# Patient Record
Sex: FEMALE | Race: WHITE | Employment: FULL TIME | URBAN - METROPOLITAN AREA
[De-identification: names, ages, dates, MRNs, and addresses within clinical notes are randomized per-mention and may not be internally consistent; named-entity substitution may affect disease eponyms.]

---

## 2019-03-21 PROBLEM — E66.01 OBESITY, MORBID (HCC): Status: ACTIVE | Noted: 2019-03-21

## 2020-01-06 PROBLEM — N84.0 UTERINE POLYP: Status: ACTIVE | Noted: 2020-01-06

## 2020-01-06 PROBLEM — N83.202 CYST OF LEFT OVARY: Status: ACTIVE | Noted: 2020-01-06

## 2020-02-20 PROBLEM — N93.9 ABNORMAL UTERINE BLEEDING (AUB): Status: ACTIVE | Noted: 2020-02-20

## 2020-02-20 PROBLEM — N83.209 CYST OF OVARY: Status: ACTIVE | Noted: 2020-01-06

## 2022-02-10 ENCOUNTER — HOSPITAL ENCOUNTER (OUTPATIENT)
Dept: SURGERY | Age: 37
Discharge: HOME | End: 2022-02-10

## 2022-02-10 VITALS — HEIGHT: 68 IN | BODY MASS INDEX: 44.41 KG/M2 | WEIGHT: 293 LBS

## 2022-02-10 RX ORDER — BUPROPION HYDROCHLORIDE 150 MG/1
1 TABLET ORAL
COMMUNITY
Start: 2021-08-12 | End: 2022-03-02 | Stop reason: SINTOL

## 2022-02-10 NOTE — PERIOP NOTES
Patient verified name and . Order for consent NOT found in EHR; patient verifies procedure. Type 1B surgery, phone assessment complete. Orders not received. Labs per surgeon: no orders received  Labs per anesthesia protocol: Patient's states Hgb 12.5 on 22; lab work was completed at Dr. Anup Yepez' office (hematology). Care everywhere is not updating. Pt instructed to bring results DOS. Patient states she lives over an hour aware and will have a COVID test completed within 4 days of sx. Pt instructed to bring results DOS. Pt verbalized understanding. Patient answered medical/surgical history questions at their best of ability. All prior to admission medications documented in [facility]. Patient instructed to take the following medications the day of surgery according to anesthesia guidelines with a small sip of water: Wellbutrin PRN, Nexium Zoloft. On the day before surgery please take Acetaminophen 1000mg in the morning and then again before bed. You may substitute for Tylenol 650 mg. Hold all vitamins 7 days prior to surgery and NSAIDS 5 days prior to surgery. Patient instructed on the following:    > Arrive at [address], time of arrival to be called the day before by 1700  > NPO after midnight including gum, mints, and ice chips  > Responsible adult must drive patient to the hospital, stay during surgery, and patient will need supervision 24 hours after anesthesia  > Use anti-bacterial soap in shower the night before surgery and on the morning of surgery  > All piercings must be removed prior to arrival.    > Leave all valuables (money and jewelry) at home but bring insurance card and ID on DOS.   > You may be required to pay a deductible or co-pay on the day of your procedure. You can pre-pay by calling [phone] if your surgery is at the [facility] or [phone] if your surgery is at the [facility].   > Do not wear make-up, nail polish, lotions, cologne, perfumes, powders, or oil on skin. Artificial nails are not permitted.

## 2022-02-11 NOTE — H&P
History and Physical      Ana Gray:   Physician:  Leonel Wei. Alt, DO    This 43 y.o. female presents today for pre-operative evaluation. History: This 43 y.o.  patient has history of  heavy bleeding, dermoid cyst, and endometrial polyp. Previous treatment includes: serial us and progesterone therapy. She has not had a cycle since the fall. Discussed that she is at a huge risk of uterine cancer with her weight. She has had multiple us that shows polyp. OB History        2    Para   1    Term   1            AB   1    Living   1       SAB   1    IAB        Ectopic        Molar        Multiple        Live Births   1                Past Medical History:   Diagnosis Date    Anxiety and depression     managed with medication     Essential hypertension     hx during teens    Fracture 2015    Left Foot    GERD (gastroesophageal reflux disease)     managed with medication     Iron deficiency anemia     secondary to menstrul cycle, followed by Dr. Lizz Sahu, Hgb 12.5 22    Irregular menstruation     Miscarriage     SAB    Ovarian cyst 2020    Other    Paratubal cyst     Uterine anomaly 2020    Polyp        has a past surgical history that includes hx ovarian cyst removal; hx wisdom teeth extraction; and hx orthopaedic (Left, ). Current Outpatient Medications   Medication Sig Dispense    Lactobacillus acidophilus (PROBIOTIC PO) Take  by mouth.  buPROPion XL (Wellbutrin XL) 150 mg tablet Take 1 Tablet by mouth daily as needed.  esomeprazole magnesium (NEXIUM PO) Take 20 mg by mouth daily.  sertraline (ZOLOFT) 100 mg tablet Take  by mouth daily. No current facility-administered medications for this visit. No Known Allergies. reports that she has never smoked. She has never used smokeless tobacco. She reports that she does not drink alcohol and does not use drugs.     family history includes Cancer in her paternal aunt; Diabetes in her mother; Heart Disease in her father, maternal grandfather, mother, and paternal grandfather; Hypertension in her father; No Known Problems in her maternal grandmother, paternal grandmother, and sister; Ovarian Cancer in her mother. Physical Exam:    Vitals:    02/11/22 0838   BP: 112/68   Weight: (!) 365 lb 6.4 oz (165.7 kg)   Height: 5' 6\" (1.676 m)       Patient without distress. Heart: Regular rate and rhythm   Lung: clear to auscultation throughout lung fields, no wheezes, no rales, no rhonchi and normal respiratory effort  Abdomen: soft, nontender  Lower Extremities:  - Edema No    Findings/Diagnosis:   Pre-Op Evaluation done today. abnormal uterine bleeding and dermoid cyst, endometrial polyp     Surgery to be Performed:  hysteroscopy with D&C  Surgery Date:    Surgery Place:  [facility]  Surgery Duration:  0.5 hour  Surgery Type:  Assistant Surgeon: The risks of surgery were discussed in detail, including anesthesia, hemorrhage, blood clots, infection, risk of uterine perforation and need for lapaoroscopy. She is very worried and anxious and has postponed this. She is worried about going to sleep. Could have spinal.  She does not want to have it done but also knows that she needs to. All questions answered.

## 2022-02-16 ENCOUNTER — ANESTHESIA EVENT (OUTPATIENT)
Dept: SURGERY | Age: 37
End: 2022-02-16
Payer: COMMERCIAL

## 2022-02-17 ENCOUNTER — ANESTHESIA (OUTPATIENT)
Dept: SURGERY | Age: 37
End: 2022-02-17
Payer: COMMERCIAL

## 2022-02-17 ENCOUNTER — HOSPITAL ENCOUNTER (OUTPATIENT)
Age: 37
Setting detail: OUTPATIENT SURGERY
Discharge: HOME | End: 2022-02-17
Attending: OBSTETRICS & GYNECOLOGY | Admitting: OBSTETRICS & GYNECOLOGY
Payer: COMMERCIAL

## 2022-02-17 VITALS
BODY MASS INDEX: 45.99 KG/M2 | WEIGHT: 293 LBS | DIASTOLIC BLOOD PRESSURE: 60 MMHG | SYSTOLIC BLOOD PRESSURE: 131 MMHG | RESPIRATION RATE: 15 BRPM | HEART RATE: 72 BPM | HEIGHT: 67 IN | TEMPERATURE: 98.1 F | OXYGEN SATURATION: 95 %

## 2022-02-17 DIAGNOSIS — N84.0 UTERINE POLYP: ICD-10-CM

## 2022-02-17 DIAGNOSIS — N93.9 ABNORMAL UTERINE BLEEDING (AUB): ICD-10-CM

## 2022-02-17 LAB — HCG UR QL: NEGATIVE

## 2022-02-17 PROCEDURE — 77030039425 HC BLD LARYNG TRULITE DISP TELE -A: Performed by: ANESTHESIOLOGY

## 2022-02-17 PROCEDURE — 76210000021 HC REC RM PH II 0.5 TO 1 HR: Performed by: OBSTETRICS & GYNECOLOGY

## 2022-02-17 PROCEDURE — 74011250637 HC RX REV CODE- 250/637: Performed by: ANESTHESIOLOGY

## 2022-02-17 PROCEDURE — 77030037088 HC TUBE ENDOTRACH ORAL NSL COVD-A: Performed by: ANESTHESIOLOGY

## 2022-02-17 PROCEDURE — 74011250636 HC RX REV CODE- 250/636: Performed by: ANESTHESIOLOGY

## 2022-02-17 PROCEDURE — 76010000138 HC OR TIME 0.5 TO 1 HR: Performed by: OBSTETRICS & GYNECOLOGY

## 2022-02-17 PROCEDURE — 76060000032 HC ANESTHESIA 0.5 TO 1 HR: Performed by: OBSTETRICS & GYNECOLOGY

## 2022-02-17 PROCEDURE — 81025 URINE PREGNANCY TEST: CPT

## 2022-02-17 PROCEDURE — 76210000006 HC OR PH I REC 0.5 TO 1 HR: Performed by: OBSTETRICS & GYNECOLOGY

## 2022-02-17 PROCEDURE — 74011000250 HC RX REV CODE- 250: Performed by: ANESTHESIOLOGY

## 2022-02-17 PROCEDURE — 77030019927 HC TBNG IRR CYSTO BAXT -A: Performed by: OBSTETRICS & GYNECOLOGY

## 2022-02-17 PROCEDURE — 77030040361 HC SLV COMPR DVT MDII -B: Performed by: OBSTETRICS & GYNECOLOGY

## 2022-02-17 PROCEDURE — 88305 TISSUE EXAM BY PATHOLOGIST: CPT

## 2022-02-17 PROCEDURE — 58558 HYSTEROSCOPY BIOPSY: CPT | Performed by: OBSTETRICS & GYNECOLOGY

## 2022-02-17 PROCEDURE — 2709999900 HC NON-CHARGEABLE SUPPLY: Performed by: OBSTETRICS & GYNECOLOGY

## 2022-02-17 RX ORDER — SODIUM CHLORIDE, SODIUM LACTATE, POTASSIUM CHLORIDE, CALCIUM CHLORIDE 600; 310; 30; 20 MG/100ML; MG/100ML; MG/100ML; MG/100ML
100 INJECTION, SOLUTION INTRAVENOUS CONTINUOUS
Status: DISCONTINUED | OUTPATIENT
Start: 2022-02-17 | End: 2022-02-17 | Stop reason: HOSPADM

## 2022-02-17 RX ORDER — SODIUM CHLORIDE 0.9 % (FLUSH) 0.9 %
5-40 SYRINGE (ML) INJECTION EVERY 8 HOURS
Status: DISCONTINUED | OUTPATIENT
Start: 2022-02-17 | End: 2022-02-17 | Stop reason: HOSPADM

## 2022-02-17 RX ORDER — OXYCODONE HYDROCHLORIDE 5 MG/1
5 TABLET ORAL
Status: COMPLETED | OUTPATIENT
Start: 2022-02-17 | End: 2022-02-17

## 2022-02-17 RX ORDER — OXYCODONE HYDROCHLORIDE 5 MG/1
5 TABLET ORAL
Qty: 5 TABLET | Refills: 0 | Status: SHIPPED | OUTPATIENT
Start: 2022-02-17 | End: 2022-02-20

## 2022-02-17 RX ORDER — ROCURONIUM BROMIDE 10 MG/ML
INJECTION, SOLUTION INTRAVENOUS AS NEEDED
Status: DISCONTINUED | OUTPATIENT
Start: 2022-02-17 | End: 2022-02-17 | Stop reason: HOSPADM

## 2022-02-17 RX ORDER — LIDOCAINE HYDROCHLORIDE 10 MG/ML
0.1 INJECTION INFILTRATION; PERINEURAL AS NEEDED
Status: DISCONTINUED | OUTPATIENT
Start: 2022-02-17 | End: 2022-02-17 | Stop reason: HOSPADM

## 2022-02-17 RX ORDER — ONDANSETRON 2 MG/ML
INJECTION INTRAMUSCULAR; INTRAVENOUS AS NEEDED
Status: DISCONTINUED | OUTPATIENT
Start: 2022-02-17 | End: 2022-02-17 | Stop reason: HOSPADM

## 2022-02-17 RX ORDER — SODIUM CHLORIDE 0.9 % (FLUSH) 0.9 %
5-40 SYRINGE (ML) INJECTION AS NEEDED
Status: DISCONTINUED | OUTPATIENT
Start: 2022-02-17 | End: 2022-02-17 | Stop reason: HOSPADM

## 2022-02-17 RX ORDER — NALOXONE HYDROCHLORIDE 0.4 MG/ML
0.2 INJECTION, SOLUTION INTRAMUSCULAR; INTRAVENOUS; SUBCUTANEOUS AS NEEDED
Status: DISCONTINUED | OUTPATIENT
Start: 2022-02-17 | End: 2022-02-17 | Stop reason: HOSPADM

## 2022-02-17 RX ORDER — LIDOCAINE HYDROCHLORIDE 20 MG/ML
INJECTION, SOLUTION EPIDURAL; INFILTRATION; INTRACAUDAL; PERINEURAL AS NEEDED
Status: DISCONTINUED | OUTPATIENT
Start: 2022-02-17 | End: 2022-02-17 | Stop reason: HOSPADM

## 2022-02-17 RX ORDER — HYDROMORPHONE HYDROCHLORIDE 2 MG/ML
0.5 INJECTION, SOLUTION INTRAMUSCULAR; INTRAVENOUS; SUBCUTANEOUS
Status: DISCONTINUED | OUTPATIENT
Start: 2022-02-17 | End: 2022-02-17 | Stop reason: HOSPADM

## 2022-02-17 RX ORDER — FENTANYL CITRATE 50 UG/ML
INJECTION, SOLUTION INTRAMUSCULAR; INTRAVENOUS AS NEEDED
Status: DISCONTINUED | OUTPATIENT
Start: 2022-02-17 | End: 2022-02-17 | Stop reason: HOSPADM

## 2022-02-17 RX ORDER — OXYCODONE HYDROCHLORIDE 5 MG/1
10 TABLET ORAL
Status: DISCONTINUED | OUTPATIENT
Start: 2022-02-17 | End: 2022-02-17 | Stop reason: HOSPADM

## 2022-02-17 RX ORDER — DEXAMETHASONE SODIUM PHOSPHATE 100 MG/10ML
INJECTION INTRAMUSCULAR; INTRAVENOUS AS NEEDED
Status: DISCONTINUED | OUTPATIENT
Start: 2022-02-17 | End: 2022-02-17 | Stop reason: HOSPADM

## 2022-02-17 RX ORDER — FLUMAZENIL 0.1 MG/ML
0.2 INJECTION INTRAVENOUS
Status: DISCONTINUED | OUTPATIENT
Start: 2022-02-17 | End: 2022-02-17 | Stop reason: HOSPADM

## 2022-02-17 RX ORDER — ACETAMINOPHEN 500 MG
1000 TABLET ORAL
Qty: 12 TABLET | Refills: 0 | Status: SHIPPED | OUTPATIENT
Start: 2022-02-17

## 2022-02-17 RX ORDER — DIPHENHYDRAMINE HYDROCHLORIDE 50 MG/ML
12.5 INJECTION, SOLUTION INTRAMUSCULAR; INTRAVENOUS
Status: DISCONTINUED | OUTPATIENT
Start: 2022-02-17 | End: 2022-02-17 | Stop reason: HOSPADM

## 2022-02-17 RX ORDER — SUCCINYLCHOLINE CHLORIDE 20 MG/ML
INJECTION INTRAMUSCULAR; INTRAVENOUS AS NEEDED
Status: DISCONTINUED | OUTPATIENT
Start: 2022-02-17 | End: 2022-02-17 | Stop reason: HOSPADM

## 2022-02-17 RX ORDER — KETOROLAC TROMETHAMINE 30 MG/ML
INJECTION, SOLUTION INTRAMUSCULAR; INTRAVENOUS AS NEEDED
Status: DISCONTINUED | OUTPATIENT
Start: 2022-02-17 | End: 2022-02-17 | Stop reason: HOSPADM

## 2022-02-17 RX ORDER — FENTANYL CITRATE 50 UG/ML
100 INJECTION, SOLUTION INTRAMUSCULAR; INTRAVENOUS ONCE
Status: DISCONTINUED | OUTPATIENT
Start: 2022-02-17 | End: 2022-02-17 | Stop reason: HOSPADM

## 2022-02-17 RX ORDER — IBUPROFEN 800 MG/1
800 TABLET ORAL
Qty: 10 TABLET | Refills: 0 | Status: SHIPPED | OUTPATIENT
Start: 2022-02-17 | End: 2022-02-18

## 2022-02-17 RX ORDER — MIDAZOLAM HYDROCHLORIDE 1 MG/ML
2 INJECTION, SOLUTION INTRAMUSCULAR; INTRAVENOUS
Status: COMPLETED | OUTPATIENT
Start: 2022-02-17 | End: 2022-02-17

## 2022-02-17 RX ORDER — ACETAMINOPHEN 500 MG
1000 TABLET ORAL ONCE
Status: DISCONTINUED | OUTPATIENT
Start: 2022-02-17 | End: 2022-02-17 | Stop reason: HOSPADM

## 2022-02-17 RX ORDER — MIDAZOLAM HYDROCHLORIDE 1 MG/ML
2 INJECTION, SOLUTION INTRAMUSCULAR; INTRAVENOUS ONCE
Status: DISCONTINUED | OUTPATIENT
Start: 2022-02-17 | End: 2022-02-17 | Stop reason: HOSPADM

## 2022-02-17 RX ORDER — PROPOFOL 10 MG/ML
INJECTION, EMULSION INTRAVENOUS AS NEEDED
Status: DISCONTINUED | OUTPATIENT
Start: 2022-02-17 | End: 2022-02-17 | Stop reason: HOSPADM

## 2022-02-17 RX ADMIN — LIDOCAINE HYDROCHLORIDE 100 MG: 20 INJECTION, SOLUTION EPIDURAL; INFILTRATION; INTRACAUDAL; PERINEURAL at 11:03

## 2022-02-17 RX ADMIN — ROCURONIUM BROMIDE 10 MG: 50 INJECTION, SOLUTION INTRAVENOUS at 11:03

## 2022-02-17 RX ADMIN — ONDANSETRON 4 MG: 2 INJECTION INTRAMUSCULAR; INTRAVENOUS at 11:08

## 2022-02-17 RX ADMIN — FENTANYL CITRATE 100 MCG: 50 INJECTION INTRAMUSCULAR; INTRAVENOUS at 11:03

## 2022-02-17 RX ADMIN — OXYCODONE HYDROCHLORIDE 5 MG: 5 TABLET ORAL at 12:04

## 2022-02-17 RX ADMIN — MIDAZOLAM 2 MG: 1 INJECTION INTRAMUSCULAR; INTRAVENOUS at 10:43

## 2022-02-17 RX ADMIN — PROPOFOL 200 MG: 10 INJECTION, EMULSION INTRAVENOUS at 11:03

## 2022-02-17 RX ADMIN — SUCCINYLCHOLINE CHLORIDE 200 MG: 20 INJECTION, SOLUTION INTRAMUSCULAR; INTRAVENOUS at 11:03

## 2022-02-17 RX ADMIN — DEXAMETHASONE SODIUM PHOSPHATE 10 MG: 10 INJECTION INTRAMUSCULAR; INTRAVENOUS at 11:08

## 2022-02-17 RX ADMIN — KETOROLAC TROMETHAMINE 30 MG: 30 INJECTION, SOLUTION INTRAMUSCULAR; INTRAVENOUS at 11:31

## 2022-02-17 RX ADMIN — SODIUM CHLORIDE, SODIUM LACTATE, POTASSIUM CHLORIDE, AND CALCIUM CHLORIDE 100 ML/HR: 600; 310; 30; 20 INJECTION, SOLUTION INTRAVENOUS at 09:58

## 2022-02-17 NOTE — OP NOTES
Hysteroscopy Operative Report    Date of Surgery: 2/17/2022      Preoperative Diagnosis: Abnormal uterine bleeding [N93.9]  Endometrial polyp [N84.0]     Postoperative Diagnosis: Abnormal uterine bleeding [N93.9]  Endometrial polyp [N84.0]    Surgeon:  Kain Johns DO    Anesthesia: General     Procedure: Procedure(s):  DILATATION AND CURETTAGE HYSTEROSCOPY     Estimated Blood Loss:     less than 5cc    Intravenous fluids were administered, lactated Ringer's 1000 ml's. Specimen:   ID Type Source Tests Collected by Time Destination   1 : Endometrial Curettings Preservative   Prowers DO Carmen 2/17/2022 1127 Pathology        Findings:  Uterus sounded to 9cm. Lush endometrium with polyp like versus fibroid white mass seen. Procedure Detail:  Patient was taken to the operating room where she was administered geta. Once her anesthesia was found to be adequate, she was then prepped and draped in normal sterile fashion and placed in low hira stirrups. Next heavy weighted speculum was placed in posterior vaginal vault and a right angle retractor was used to visualize the cervix. The anterior lip of the cervix was grasped with single-toothed tenaculum. Next uterus was dilated to accomodate a 5mm hysteroscope. Next hysteroscope was advanced to fundus with findings as noted above. Sharp currettage was then performed of the cavity. Hysteroscope advanced once more shaggy endometrium seen. currette performed. Looked once more with camera and curretted with mostly clean cavity noted. At this point the procedure was over and single toothed tenaculum was removed from anterior lip of the cervix with excellent hemostasis. Patient tolerated the procedure well.      Signed By: Kain Johns DO     February 17, 2022

## 2022-02-17 NOTE — ANESTHESIA PREPROCEDURE EVALUATION
Relevant Problems   ENDOCRINE   (+) Obesity, morbid ([address]       Anesthetic History   No history of anesthetic complications            Review of Systems / Medical History  Patient summary reviewed and pertinent labs reviewed    Pulmonary  Within defined limits                 Neuro/Psych         Psychiatric history     Cardiovascular                  Exercise tolerance: >4 METS     GI/Hepatic/Renal     GERD: well controlled           Endo/Other        Morbid obesity     Other Findings              Physical Exam    Airway  Mallampati: I  TM Distance: 4 - 6 cm  Neck ROM: normal range of motion   Mouth opening: Normal     Cardiovascular    Rhythm: regular  Rate: normal         Dental         Pulmonary  Breath sounds clear to auscultation               Abdominal         Other Findings            Anesthetic Plan    ASA: 3  Anesthesia type: general          Induction: Intravenous  Anesthetic plan and risks discussed with: Patient and Spouse

## 2022-02-17 NOTE — ANESTHESIA POSTPROCEDURE EVALUATION
Procedure(s):  DILATATION AND CURETTAGE HYSTEROSCOPY.     general    Anesthesia Post Evaluation      Multimodal analgesia: multimodal analgesia used between 6 hours prior to anesthesia start to PACU discharge  Patient location during evaluation: PACU  Patient participation: complete - patient participated  Level of consciousness: awake and alert  Pain management: adequate  Airway patency: patent  Anesthetic complications: no  Cardiovascular status: acceptable and hemodynamically stable  Respiratory status: acceptable  Hydration status: acceptable  Post anesthesia nausea and vomiting:  none  Final Post Anesthesia Temperature Assessment:  Normothermia (36.0-37.5 degrees C)      INITIAL Post-op Vital signs:   Vitals Value Taken Time   /60 02/17/22 1231   Temp 36.7 °C (98.1 °F) 02/17/22 1151   Pulse 72 02/17/22 1231   Resp 15 02/17/22 1231   SpO2 95 % 02/17/22 1231

## 2022-02-17 NOTE — DISCHARGE INSTRUCTIONS
MEDICATION INTERACTION:  During your procedure you potentially received a medication or medications which may reduce the effectiveness of oral contraceptives. Please consider other forms of contraception for 1 month following your procedure if you are currently using oral contraceptives as your primary form of birth control. In addition to this, we recommend continuing your oral contraceptive as prescribed, unless otherwise instructed by your physician, during this time    After general anesthesia or intravenous sedation, for 24 hours or while taking prescription Narcotics:  · Limit your activities  · A responsible adult needs to be with you for the next 24 hours  · Do not drive and operate hazardous machinery  · Do not make important personal or business decisions  · Do not drink alcoholic beverages  · If you have not urinated within 8 hours after discharge, please contact your surgeon on call. · If you have sleep apnea and have a CPAP machine, please use it for all naps and sleeping. · Please use caution when taking narcotics and any of your home medications that may cause drowsiness. *  Please give a list of your current medications to your Primary Care Provider. *  Please update this list whenever your medications are discontinued, doses are      changed, or new medications (including over-the-counter products) are added. *  Please carry medication information at all times in case of emergency situations. These are general instructions for a healthy lifestyle:  No smoking/ No tobacco products/ Avoid exposure to second hand smoke  Surgeon General's Warning:  Quitting smoking now greatly reduces serious risk to your health.   Obesity, smoking, and sedentary lifestyle greatly increases your risk for illness  A healthy diet, regular physical exercise & weight monitoring are important for maintaining a healthy lifestyle    You may be retaining fluid if you have a history of heart failure or if you experience any of the following symptoms:  Weight gain of 3 pounds or more overnight or 5 pounds in a week, increased swelling in our hands or feet or shortness of breath while lying flat in bed. Please call your doctor as soon as you notice any of these symptoms; do not wait until your next office visit.

## 2022-03-18 PROBLEM — N84.0 UTERINE POLYP: Status: ACTIVE | Noted: 2020-01-06

## 2022-03-18 PROBLEM — N83.209 CYST OF OVARY: Status: ACTIVE | Noted: 2020-01-06

## 2022-03-19 PROBLEM — E66.01 OBESITY, MORBID (HCC): Status: ACTIVE | Noted: 2019-03-21

## 2022-03-20 PROBLEM — N93.9 ABNORMAL UTERINE BLEEDING (AUB): Status: ACTIVE | Noted: 2020-02-20

## 2022-10-12 ENCOUNTER — OFFICE VISIT (OUTPATIENT)
Dept: OBGYN CLINIC | Age: 37
End: 2022-10-12
Payer: COMMERCIAL

## 2022-10-12 VITALS
BODY MASS INDEX: 45.99 KG/M2 | SYSTOLIC BLOOD PRESSURE: 114 MMHG | DIASTOLIC BLOOD PRESSURE: 74 MMHG | HEIGHT: 67 IN | WEIGHT: 293 LBS

## 2022-10-12 DIAGNOSIS — N93.9 ABNORMAL UTERINE BLEEDING (AUB): Primary | ICD-10-CM

## 2022-10-12 DIAGNOSIS — Z13.0 SCREENING FOR IRON DEFICIENCY ANEMIA: ICD-10-CM

## 2022-10-12 DIAGNOSIS — N83.202 CYST OF LEFT OVARY: ICD-10-CM

## 2022-10-12 LAB — HEMOGLOBIN, POC: 11.5 G/DL

## 2022-10-12 PROCEDURE — 4004F PT TOBACCO SCREEN RCVD TLK: CPT | Performed by: OBSTETRICS & GYNECOLOGY

## 2022-10-12 PROCEDURE — G8484 FLU IMMUNIZE NO ADMIN: HCPCS | Performed by: OBSTETRICS & GYNECOLOGY

## 2022-10-12 PROCEDURE — 99214 OFFICE O/P EST MOD 30 MIN: CPT | Performed by: OBSTETRICS & GYNECOLOGY

## 2022-10-12 PROCEDURE — 85018 HEMOGLOBIN: CPT | Performed by: OBSTETRICS & GYNECOLOGY

## 2022-10-12 PROCEDURE — G8427 DOCREV CUR MEDS BY ELIG CLIN: HCPCS | Performed by: OBSTETRICS & GYNECOLOGY

## 2022-10-12 PROCEDURE — 76830 TRANSVAGINAL US NON-OB: CPT | Performed by: OBSTETRICS & GYNECOLOGY

## 2022-10-12 PROCEDURE — G8417 CALC BMI ABV UP PARAM F/U: HCPCS | Performed by: OBSTETRICS & GYNECOLOGY

## 2022-10-12 RX ORDER — DROSPIRENONE AND ETHINYL ESTRADIOL 0.03MG-3MG
1 KIT ORAL DAILY
Qty: 1 PACKET | Refills: 11 | Status: SHIPPED | OUTPATIENT
Start: 2022-10-12

## 2022-10-12 ASSESSMENT — PATIENT HEALTH QUESTIONNAIRE - PHQ9
2. FEELING DOWN, DEPRESSED OR HOPELESS: 0
SUM OF ALL RESPONSES TO PHQ QUESTIONS 1-9: 0
SUM OF ALL RESPONSES TO PHQ9 QUESTIONS 1 & 2: 0
1. LITTLE INTEREST OR PLEASURE IN DOING THINGS: 0

## 2022-10-12 NOTE — PROGRESS NOTES
Cassie Negrete  is a 43 y.o. female, No obstetric history on file. .  Patient's last menstrual period was 10/01/2022., who is being seen for ultrasound for heavy periods and pain on her left side. Her us us stable. She had about a month of bleeding and then got covid and started bleeding again. Then had 7 day  cycle. Her weight is up about 14lbs since march. She knows that she should be losing weight. Her  hurt his back and needs surgery and things have been stressful. Wants to know if she can get her HGB checked today (finger stick). Lining is thin on us. Discussed ocps versus aygestin. She tried aygestin in past and caused heavy bleeding - reluctant to try. Discussed that her lining is thin now. Blood pressure is normal.  Fingerstick hg is 11.9. Ultrasound:  CX appears wnl  Uterus is anteverted and heterogeneous   Endo= 2.1mm, no intracavitary masses visualized   ROV visualized with follicles and appears wnl  LOV visualized with follicles and cyst with thin septation measuring 5.1 x 4.6 x 2.5 cm. Again seen is a solid appearing mass with calcifications measuring 2.3x 1.4x1.2 cm (prev 2.3 x 2.4x 1.1 cm)  Appearance c/w probable dermoid   Bilateral adnexa wnl    HISTORY:    OB History          2    Para        Term   1            AB   1    Living   1         SAB        IAB        Ectopic        Molar        Multiple        Live Births                  GYN History         Sexual History:   Social History     Substance and Sexual Activity   Sexual Activity Yes    Partners: Male    Birth control/protection: None          has a past medical history of Anxiety and depression, Essential hypertension, Fracture, GERD (gastroesophageal reflux disease), Iron deficiency anemia, Irregular menstruation, Miscarriage, Ovarian cyst, Paratubal cyst, and Uterine anomaly.  .    Past Surgical History:   Procedure Laterality Date    DILATION AND CURETTAGE OF UTERUS  2022    ORTHOPEDIC SURGERY Left  [facility] Fx Repair left foot    OVARIAN CYST REMOVAL      WISDOM TOOTH EXTRACTION         Her current meds are:    Current Outpatient Medications:     Esomeprazole Magnesium (NEXIUM PO), Take by mouth, Disp: , Rfl:     Multiple Vitamin (MULTIVITAMIN PO), Take by mouth, Disp: , Rfl:     drospirenone-ethinyl estradiol (KEELEY 28) 3-0.03 MG TABS, Take 1 tablet by mouth daily, Disp: 1 packet, Rfl: 11    sertraline (ZOLOFT) 100 MG tablet, Take by mouth daily, Disp: , Rfl:      Review of Systems  Neg except hpi     PHYSICAL EXAM:  /74 (Site: Left Upper Arm, Position: Sitting)   Ht 5' 7\" (1.702 m)   Wt (!) 376 lb (170.6 kg)   LMP 10/01/2022   BMI 58.89 kg/m²   Physical Exam  General: well nourished well developed female in nad     ASSESSMENT / PLAN:  Julianne Villatoro was seen today for ultrasound. Diagnoses and all orders for this visit:    Abnormal uterine bleeding (AUB)  -     AMB POC US, TRANSVAGINAL  -     AMB POC HEMOGLOBIN (HGB)    Cyst of left ovary  -     AMB POC US, TRANSVAGINAL    Screening for iron deficiency anemia  -     AMB POC HEMOGLOBIN (HGB)    Other orders  -     drospirenone-ethinyl estradiol (KELEEY 28) 3-0.03 MG TABS; Take 1 tablet by mouth daily    Will start ocps today. Rtc 4 cycles or so to review how she is doing. She is aware.       Irina Castro, DO

## 2023-02-15 ENCOUNTER — OFFICE VISIT (OUTPATIENT)
Dept: OBGYN CLINIC | Age: 38
End: 2023-02-15
Payer: COMMERCIAL

## 2023-02-15 VITALS
BODY MASS INDEX: 45.99 KG/M2 | HEIGHT: 67 IN | SYSTOLIC BLOOD PRESSURE: 114 MMHG | WEIGHT: 293 LBS | DIASTOLIC BLOOD PRESSURE: 76 MMHG

## 2023-02-15 DIAGNOSIS — N91.4 SECONDARY OLIGOMENORRHEA: Primary | ICD-10-CM

## 2023-02-15 PROCEDURE — 99213 OFFICE O/P EST LOW 20 MIN: CPT | Performed by: OBSTETRICS & GYNECOLOGY

## 2023-02-15 PROCEDURE — 4004F PT TOBACCO SCREEN RCVD TLK: CPT | Performed by: OBSTETRICS & GYNECOLOGY

## 2023-02-15 PROCEDURE — G8417 CALC BMI ABV UP PARAM F/U: HCPCS | Performed by: OBSTETRICS & GYNECOLOGY

## 2023-02-15 PROCEDURE — G8428 CUR MEDS NOT DOCUMENT: HCPCS | Performed by: OBSTETRICS & GYNECOLOGY

## 2023-02-15 PROCEDURE — G8484 FLU IMMUNIZE NO ADMIN: HCPCS | Performed by: OBSTETRICS & GYNECOLOGY

## 2023-02-15 RX ORDER — SEMAGLUTIDE 0.25 MG/.5ML
INJECTION, SOLUTION SUBCUTANEOUS
COMMUNITY
Start: 2023-01-30

## 2023-02-15 ASSESSMENT — PATIENT HEALTH QUESTIONNAIRE - PHQ9
SUM OF ALL RESPONSES TO PHQ QUESTIONS 1-9: 0
SUM OF ALL RESPONSES TO PHQ QUESTIONS 1-9: 0
1. LITTLE INTEREST OR PLEASURE IN DOING THINGS: 0
SUM OF ALL RESPONSES TO PHQ QUESTIONS 1-9: 0
2. FEELING DOWN, DEPRESSED OR HOPELESS: 0
SUM OF ALL RESPONSES TO PHQ9 QUESTIONS 1 & 2: 0
SUM OF ALL RESPONSES TO PHQ QUESTIONS 1-9: 0

## 2023-02-15 NOTE — PROGRESS NOTES
Breonna Church  is a 42 y.o. female, No obstetric history on file. .  Patient's last menstrual period was 2023 (within days). , who is being seen for follow up after starting morris birth control. She was last seen on 10/12/22 for abnormal uterine bleeding. She had an ultrasound at that visit. She states that she has not taken morris yet and then began taking weygovy and was advised to not take birth control with this. Began weygovy approximately 2 months ago. She has not had much a period since last visit. She had spotting around 1 week ago. She had taken provera or aygestin in the past and is convinced that it made her hemorrhage. She is super frustrated - she thought she had lost at least 10 lbs on this weight loss medication but by our scale only 2 lbs. Reassured pt. She is not eating as much and has been active with household things. She is going to reach out to her pcp regarding this. She did not get approved for other weight loss meds because she is not diabetic. She has not had a pap smear since 2016 but she \"is not prepared to have one today. \"          HISTORY:    OB History          2    Para   1    Term   1            AB   1    Living   1         SAB   1    IAB        Ectopic        Molar        Multiple        Live Births   1              GYN History         Sexual History:   Social History     Substance and Sexual Activity   Sexual Activity Yes    Partners: Male    Birth control/protection: None          has a past medical history of Anxiety and depression, Essential hypertension, Fracture, GERD (gastroesophageal reflux disease), Iron deficiency anemia, Irregular menstruation, Miscarriage, Ovarian cyst, Paratubal cyst, Postpartum depression, and Uterine anomaly.  .    Past Surgical History:   Procedure Laterality Date    DILATION AND CURETTAGE  2022    DILATION AND CURETTAGE OF UTERUS  2022    ORTHOPEDIC SURGERY Left     Ju[facility]ino Fx Repair left foot    OVARIAN CYST REMOVAL WISDOM TOOTH EXTRACTION         Her current meds are:    Current Outpatient Medications:     WEGOVY 0.25 MG/0.5ML SOAJ SC injection, Weekly injection, Disp: , Rfl:     Sertraline HCl (ZOLOFT PO), Take 75 mg by mouth daily, Disp: , Rfl:     Esomeprazole Magnesium (NEXIUM PO), Take by mouth, Disp: , Rfl:     Multiple Vitamin (MULTIVITAMIN PO), Take by mouth, Disp: , Rfl:      Review of Systems  Neg except hpi     PHYSICAL EXAM:  /76 (Site: Right Upper Arm, Position: Sitting)   Ht 5' 7\" (1.702 m)   Wt (!) 374 lb 9.6 oz (169.9 kg)   LMP 02/06/2023 (Within Days)   BMI 58.67 kg/m²   Physical Exam  General: well nourished well developed female in nad     ASSESSMENT / PLAN:  Den was seen today for follow-up. Diagnoses and all orders for this visit:    Secondary oligomenorrhea    She is reluctant to want to be on hormones. Rtc for us to look at lining  / assess ovaries. Pap smear same day. Encouraged pt.       Irina Castro, DO

## 2023-03-15 ENCOUNTER — OFFICE VISIT (OUTPATIENT)
Dept: OBGYN CLINIC | Age: 38
End: 2023-03-15

## 2023-03-15 VITALS
HEIGHT: 67 IN | SYSTOLIC BLOOD PRESSURE: 122 MMHG | BODY MASS INDEX: 45.99 KG/M2 | WEIGHT: 293 LBS | DIASTOLIC BLOOD PRESSURE: 80 MMHG

## 2023-03-15 DIAGNOSIS — N91.4 SECONDARY OLIGOMENORRHEA: Primary | ICD-10-CM

## 2023-03-15 DIAGNOSIS — N83.202 CYST OF LEFT OVARY: ICD-10-CM

## 2023-03-15 ASSESSMENT — PATIENT HEALTH QUESTIONNAIRE - PHQ9
SUM OF ALL RESPONSES TO PHQ QUESTIONS 1-9: 0
2. FEELING DOWN, DEPRESSED OR HOPELESS: 0
SUM OF ALL RESPONSES TO PHQ9 QUESTIONS 1 & 2: 0
SUM OF ALL RESPONSES TO PHQ QUESTIONS 1-9: 0
SUM OF ALL RESPONSES TO PHQ QUESTIONS 1-9: 0
1. LITTLE INTEREST OR PLEASURE IN DOING THINGS: 0
SUM OF ALL RESPONSES TO PHQ QUESTIONS 1-9: 0

## 2023-03-15 NOTE — PROGRESS NOTES
Jesi Dean  is a 42 y.o. female, . No LMP recorded. (Menstrual status: Irregular periods). , who is being seen for ultrasound to evaluated oligomenorrhea, hx of lt ov dermoid and hx of polyp. Discussed us with pt. Lining is good. Started having bleeding last night. Dermoid stable and simple cyst noted. Occ pt has llq pain - overall ovarian size is stable. Pt stopped taking her weight loss medication because of how it made her feel. Not losing weight like she thought. She is going to reach out to her provider and ask about Ozempic. She tried to do an appear for [facility]. She has deferred having a pap smear and now she is having bleeding today. Want to get a good sample. .  She has an annual exam scheduled soon. US Findings:  Cx appears wnl, nabothian cysts seen  Uterus is anteverted and inhomogeneous  Endo=5.3 mm, no definite intracavitary masses visualized  ROV visualized with follicles and simple cyst measuring 1.9 x 1.6 x 1.8 cm  LOV visualized with probable dermoid measuring 1.8 x 1.3 x 2.0 cm (prev. 2.3 x 1.4 x 1.2 cm) simple cyst also seen measuring 1.3 x 1.1 x 1.2 cm  Bilateral AdN appear wnl    HISTORY:    OB History          2    Para   1    Term   1            AB   1    Living   1         SAB   1    IAB        Ectopic        Molar        Multiple        Live Births   1              GYN History         Sexual History:   Social History     Substance and Sexual Activity   Sexual Activity Yes    Partners: Male    Birth control/protection: None          has a past medical history of Anxiety and depression, Essential hypertension, Fracture, GERD (gastroesophageal reflux disease), Iron deficiency anemia, Irregular menstruation, Miscarriage, Ovarian cyst, Paratubal cyst, Postpartum depression, and Uterine anomaly.  .    Past Surgical History:   Procedure Laterality Date    DILATION AND CURETTAGE  2022    DILATION AND CURETTAGE OF UTERUS  2022    ORTHOPEDIC SURGERY Left 2015    [facility] Fx Repair left foot    OVARIAN CYST REMOVAL      WISDOM TOOTH EXTRACTION         Her current meds are:    Current Outpatient Medications:     Sertraline HCl (ZOLOFT PO), Take 75 mg by mouth daily, Disp: , Rfl:     Esomeprazole Magnesium (NEXIUM PO), Take by mouth, Disp: , Rfl:     Multiple Vitamin (MULTIVITAMIN PO), Take by mouth, Disp: , Rfl:      Review of Systems  Neg except hpi     PHYSICAL EXAM:  /80 (Site: Right Upper Arm, Position: Sitting)   Ht 5' 7\" (1.702 m)   Wt (!) 373 lb (169.2 kg)   BMI 58.42 kg/m²   Physical Exam  General: well nourished well developed female in nad     ASSESSMENT / PLAN:  Cierra Roche was seen today for ultrasound. Diagnoses and all orders for this visit:    Secondary oligomenorrhea  -     AMB POC US, TRANSVAGINAL    Cyst of left ovary  -     AMB POC US, TRANSVAGINAL    Stable us. Rtc soon for annual exam.  Encouraged her to explore medical weight loss. She is not interested in bariatric surgery.       Irina Castro, DO

## 2023-05-17 ENCOUNTER — OFFICE VISIT (OUTPATIENT)
Dept: OBGYN CLINIC | Age: 38
End: 2023-05-17
Payer: COMMERCIAL

## 2023-05-17 VITALS
SYSTOLIC BLOOD PRESSURE: 114 MMHG | WEIGHT: 293 LBS | BODY MASS INDEX: 45.99 KG/M2 | HEIGHT: 67 IN | DIASTOLIC BLOOD PRESSURE: 82 MMHG

## 2023-05-17 DIAGNOSIS — Z13.89 SCREENING FOR GENITOURINARY CONDITION: ICD-10-CM

## 2023-05-17 DIAGNOSIS — Z11.51 SPECIAL SCREENING EXAMINATION FOR HUMAN PAPILLOMAVIRUS (HPV): ICD-10-CM

## 2023-05-17 DIAGNOSIS — Z01.419 WELL WOMAN EXAM: Primary | ICD-10-CM

## 2023-05-17 PROCEDURE — 99395 PREV VISIT EST AGE 18-39: CPT | Performed by: OBSTETRICS & GYNECOLOGY

## 2023-05-17 SDOH — ECONOMIC STABILITY: FOOD INSECURITY: WITHIN THE PAST 12 MONTHS, YOU WORRIED THAT YOUR FOOD WOULD RUN OUT BEFORE YOU GOT MONEY TO BUY MORE.: NEVER TRUE

## 2023-05-17 SDOH — ECONOMIC STABILITY: HOUSING INSECURITY
IN THE LAST 12 MONTHS, WAS THERE A TIME WHEN YOU DID NOT HAVE A STEADY PLACE TO SLEEP OR SLEPT IN A SHELTER (INCLUDING NOW)?: NO

## 2023-05-17 SDOH — ECONOMIC STABILITY: FOOD INSECURITY: WITHIN THE PAST 12 MONTHS, THE FOOD YOU BOUGHT JUST DIDN'T LAST AND YOU DIDN'T HAVE MONEY TO GET MORE.: NEVER TRUE

## 2023-05-17 SDOH — ECONOMIC STABILITY: TRANSPORTATION INSECURITY
IN THE PAST 12 MONTHS, HAS LACK OF TRANSPORTATION KEPT YOU FROM MEETINGS, WORK, OR FROM GETTING THINGS NEEDED FOR DAILY LIVING?: NO

## 2023-05-17 SDOH — ECONOMIC STABILITY: INCOME INSECURITY: HOW HARD IS IT FOR YOU TO PAY FOR THE VERY BASICS LIKE FOOD, HOUSING, MEDICAL CARE, AND HEATING?: NOT HARD AT ALL

## 2023-05-17 ASSESSMENT — PATIENT HEALTH QUESTIONNAIRE - PHQ9
SUM OF ALL RESPONSES TO PHQ QUESTIONS 1-9: 0
SUM OF ALL RESPONSES TO PHQ9 QUESTIONS 1 & 2: 0
1. LITTLE INTEREST OR PLEASURE IN DOING THINGS: 0
SUM OF ALL RESPONSES TO PHQ QUESTIONS 1-9: 0
2. FEELING DOWN, DEPRESSED OR HOPELESS: 0

## 2023-05-26 LAB
CYTOLOGIST CVX/VAG CYTO: NORMAL
CYTOLOGY CVX/VAG DOC THIN PREP: NORMAL
HPV APTIMA: NEGATIVE
Lab: NORMAL
Lab: NORMAL
PATH REPORT.FINAL DX SPEC: NORMAL
STAT OF ADQ CVX/VAG CYTO-IMP: NORMAL

## (undated) DEVICE — JELLY LUBRICATING 10GM PREFIL SYR LUBE

## (undated) DEVICE — GARMENT,MEDLINE,DVT,INT,CALF,MED, GEN2: Brand: MEDLINE

## (undated) DEVICE — DRAPE TWL SURG 16X26IN BLU ORB04] ALLCARE INC]

## (undated) DEVICE — SOLUTION IRRIG 3000ML 0.9% SOD CHL FLX CONT 0797208] ICU MEDICAL INC]

## (undated) DEVICE — CARDINAL HEALTH FLEXIBLE LIGHT HANDLE COVER: Brand: CARDINAL HEALTH

## (undated) DEVICE — CONTAINER SPEC FRMLN 120ML --

## (undated) DEVICE — REM POLYHESIVE ADULT PATIENT RETURN ELECTRODE: Brand: VALLEYLAB

## (undated) DEVICE — PAD,NON-ADHERENT,3X8,STERILE,LF,1/PK: Brand: MEDLINE

## (undated) DEVICE — TRAY PREP DRY W/ PREM GLV 2 APPL 6 SPNG 2 UNDPD 1 OVERWRAP

## (undated) DEVICE — GOWN,REINF,POLY,ECL,PP SLV,XL: Brand: MEDLINE

## (undated) DEVICE — MINOR LITHOTOMY PACK: Brand: MEDLINE INDUSTRIES, INC.

## (undated) DEVICE — CYSTO/BLADDER IRRIGATION SET, REGULATING CLAMP

## (undated) DEVICE — DRAPE,UNDERBUTTOCKS,PCH,STERILE: Brand: MEDLINE

## (undated) DEVICE — PAD MATERNITY 11IN W/TAILS -- STRL